# Patient Record
Sex: MALE | Race: BLACK OR AFRICAN AMERICAN
[De-identification: names, ages, dates, MRNs, and addresses within clinical notes are randomized per-mention and may not be internally consistent; named-entity substitution may affect disease eponyms.]

---

## 2019-02-28 ENCOUNTER — HOSPITAL ENCOUNTER (OUTPATIENT)
Dept: HOSPITAL 62 - OD | Age: 54
End: 2019-02-28
Attending: PHYSICIAN ASSISTANT
Payer: COMMERCIAL

## 2019-02-28 DIAGNOSIS — R07.9: Primary | ICD-10-CM

## 2019-02-28 PROCEDURE — 84443 ASSAY THYROID STIM HORMONE: CPT

## 2019-02-28 PROCEDURE — 83735 ASSAY OF MAGNESIUM: CPT

## 2019-02-28 PROCEDURE — 36415 COLL VENOUS BLD VENIPUNCTURE: CPT

## 2019-12-20 ENCOUNTER — HOSPITAL ENCOUNTER (OUTPATIENT)
Dept: HOSPITAL 62 - OD | Age: 54
End: 2019-12-20
Attending: NURSE PRACTITIONER
Payer: COMMERCIAL

## 2019-12-20 DIAGNOSIS — M16.11: Primary | ICD-10-CM

## 2019-12-20 DIAGNOSIS — M25.551: ICD-10-CM

## 2019-12-20 NOTE — RADIOLOGY REPORT (SQ)
EXAM DESCRIPTION:  HIP RIGHT AP/LATERAL



COMPLETED DATE/TIME:  12/20/2019 9:38 am



REASON FOR STUDY:  RIGHT HIP PAIN M25.551  PAIN IN RIGHT HIP



COMPARISON:  None.



NUMBER OF VIEWS:  Two views.



TECHNIQUE:  AP pelvis and additional frog-leg view of the right hip.



LIMITATIONS:  None.



FINDINGS:  MINERALIZATION: Normal.

RIGHT HIP: No fracture or dislocation.  No worrisome bone lesions. No contour deformity.  Marked join
t space narrowing, particularly in the superior joint, with sclerosis and osteophytes.

LEFT HIP: No fracture or dislocation.  No worrisome bone lesions.

PUBIS AND ISCHIUM: No fracture.

PELVIS: No fracture.

SACRUM: No fracture or dislocation. No worrisome bone lesions.

LOWER LUMBAR SPINE: No fracture or dislocation. No worrisome bone lesions.  No significant disc disea
se.

SOFT TISSUES: No findings.

OTHER: No other significant finding.



IMPRESSION:  MARKED DEGENERATIVE CHANGES IN THE RIGHT HIP.



TECHNICAL DOCUMENTATION:  JOB ID:  [id number]

 2011 [employer]- All Rights Reserved



Reading location - IP/workstation name: [email or web address]

## 2021-01-29 ENCOUNTER — HOSPITAL ENCOUNTER (OUTPATIENT)
Dept: HOSPITAL 62 - RAD | Age: 56
End: 2021-01-29
Attending: NURSE PRACTITIONER
Payer: COMMERCIAL

## 2021-01-29 DIAGNOSIS — Z12.2: Primary | ICD-10-CM

## 2021-01-29 DIAGNOSIS — F17.200: ICD-10-CM

## 2021-01-29 DIAGNOSIS — R91.1: ICD-10-CM

## 2021-01-29 PROCEDURE — 71271 CT THORAX LUNG CANCER SCR C-: CPT

## 2021-01-29 NOTE — RADIOLOGY REPORT (SQ)
EXAM DESCRIPTION:  CT LUNG CANCER SCREENING



IMAGES COMPLETED DATE/TIME:  1/29/2021 8:19 am



REASON FOR STUDY:  ENCOUNTER FOR SCREENIG OF MAL JOSE OF LUNG IN CURRENT SMOKER WITH 30 PACK YE Z12.2 
 ENCNTR SCREEN FOR MALIGNANT NEOPLASM OF RESPIRATORY OR F17.200  NICOTINE DEPENDENCE, UNSPECIFIED, UN
COMPLICATED



COMPARISON:  None.



TECHNIQUE:  Low Dose CT scan performed of the chest without intravenous contrast for purposes of scre
ening for lung cancer.  Images reviewed with lung, soft tissue and bone windows.  Reconstructed coron
al and sagittal MPR images reviewed.  All images stored on PACS.

All CT scanners at this facility use dose modulation, iterative reconstruction, and/or weight based d
osing when appropriate to reduce radiation dose to as low as reasonably achievable (ALARA).

[facility]: Dose Right  [facility]: CareDose    [facility]: Dose Right    [facility]: Teradose 4D    [facility]: Smart Ingenicard [place]



RADIATION DOSE:  DLP:   mGy-cm.

CTDIvol:  mGy.



LIMITATIONS:  None.



FINDINGS:  LUNG:  A single 2 mm pulmonary nodule is seen within the right middle lobe (axial image 35
, sagittal reformat 27).  Incidental note is made of a perifissural lymph node involving the left ella
or fissure (axial image 33, sagittal reformat 77).  No suspicious nodules or masses.  No focal consol
idation.  No significant interstitial lung disease

AIRWAYS: Unremarkable.

MEDIASTINUM AND MARIA DEL CARMEN: No mass or significant adenopathy.

PLEURA: No pleural effusion or pneumothorax.

HEART : Normal in size.  No pericardial effusion.

MAJOR VESSELS: Normal caliber.

UPPER ABDOMEN: Partially imaged lap band.  No significant findings.

LOWER NECK: No masses.  No adenopathy.

CHEST WALL: Unremarkable.

BONES: No destructive lesions.

OTHER: No other significant findings.



IMPRESSION:  1. Benign lung findings.

2. No gross abnormality involving remaining structures.



LUNGRADS:  LUNGRADS:  2 BENIGN APPEARANCE OR BEHAVIOR.  NODULES WITH A VERY LOW LIKELIHOOD OF BECOMIN
G A CLINICALLY ACTIVE CANCER DUE TO SIZE OR LACK OF GROWTH.

MODIFIER: None.



RECOMMENDATION:  Continue annual screening with LDCT in 12 months.



COMMENT:  CRITERIA:

Solid nodule(s):  < 6 mm; new < 4 mm.

Part solid nodule(s):  < 6 mm total diameter on baseline screening.

Non solid nodule(s) (GGN):  < 20 mm OR ? 20 and unchanged or slowly growing.

Category 3 or 4 modules unchanged for ? 3 months.



TECHNICAL DOCUMENTATION:  JOB ID:  [id number]

Quality ID # [id number]: Final reports with documentation of one or more dose reduction techniques (e.g., Au
tomated exposure control, adjustment of the mA and/or kV according to patient size, use of iterative 
reconstruction technique)

 2011 [facility]



Reading location - IP/workstation name: [email or web address]